# Patient Record
Sex: FEMALE
[De-identification: names, ages, dates, MRNs, and addresses within clinical notes are randomized per-mention and may not be internally consistent; named-entity substitution may affect disease eponyms.]

---

## 2023-07-31 ENCOUNTER — APPOINTMENT (OUTPATIENT)
Dept: INTERNAL MEDICINE | Facility: CLINIC | Age: 28
End: 2023-07-31
Payer: MEDICAID

## 2023-07-31 DIAGNOSIS — G40.909 EPILEPSY, UNSPECIFIED, NOT INTRACTABLE, W/OUT STATUS EPILEPTICUS: ICD-10-CM

## 2023-07-31 DIAGNOSIS — Z00.01 ENCOUNTER FOR GENERAL ADULT MEDICAL EXAMINATION WITH ABNORMAL FINDINGS: ICD-10-CM

## 2023-07-31 DIAGNOSIS — G47.00 INSOMNIA, UNSPECIFIED: ICD-10-CM

## 2023-07-31 DIAGNOSIS — E11.9 TYPE 2 DIABETES MELLITUS W/OUT COMPLICATIONS: ICD-10-CM

## 2023-07-31 DIAGNOSIS — J45.20 MILD INTERMITTENT ASTHMA, UNCOMPLICATED: ICD-10-CM

## 2023-07-31 PROBLEM — Z00.00 ENCOUNTER FOR PREVENTIVE HEALTH EXAMINATION: Status: ACTIVE | Noted: 2023-07-31

## 2023-07-31 PROCEDURE — 99385 PREV VISIT NEW AGE 18-39: CPT

## 2023-07-31 PROCEDURE — 99214 OFFICE O/P EST MOD 30 MIN: CPT | Mod: 25

## 2023-07-31 RX ORDER — LAMOTRIGINE 200 MG/1
200 TABLET ORAL TWICE DAILY
Refills: 0 | Status: ACTIVE | COMMUNITY
Start: 2023-07-31

## 2023-07-31 RX ORDER — CITALOPRAM 40 MG/1
40 TABLET, FILM COATED ORAL DAILY
Refills: 0 | Status: ACTIVE | COMMUNITY
Start: 2023-07-31

## 2023-07-31 RX ORDER — BUDESONIDE AND FORMOTEROL FUMARATE DIHYDRATE 160; 4.5 UG/1; UG/1
160-4.5 AEROSOL RESPIRATORY (INHALATION) TWICE DAILY
Qty: 1 | Refills: 5 | Status: ACTIVE | COMMUNITY
Start: 2023-07-31 | End: 1900-01-01

## 2023-07-31 RX ORDER — ARIPIPRAZOLE 5 MG/1
5 TABLET ORAL DAILY
Refills: 0 | Status: ACTIVE | COMMUNITY
Start: 2023-07-31

## 2023-07-31 RX ORDER — MONTELUKAST 10 MG/1
10 TABLET, FILM COATED ORAL
Qty: 90 | Refills: 3 | Status: ACTIVE | COMMUNITY
Start: 2023-07-31 | End: 1900-01-01

## 2023-07-31 RX ORDER — TRAZODONE HYDROCHLORIDE 50 MG/1
50 TABLET ORAL
Refills: 0 | Status: ACTIVE | COMMUNITY
Start: 2023-07-31

## 2023-07-31 RX ORDER — BUSPIRONE HYDROCHLORIDE 5 MG/1
5 TABLET ORAL TWICE DAILY
Refills: 0 | Status: ACTIVE | COMMUNITY
Start: 2023-07-31

## 2023-07-31 RX ORDER — METFORMIN HYDROCHLORIDE 500 MG/1
500 TABLET, COATED ORAL DAILY
Refills: 0 | Status: COMPLETED | COMMUNITY
Start: 2022-07-31 | End: 2023-07-31

## 2023-08-05 PROBLEM — Z00.01 ENCOUNTER FOR ROUTINE ADULT PHYSICAL EXAM WITH ABNORMAL FINDINGS: Status: ACTIVE | Noted: 2023-08-05

## 2023-10-23 ENCOUNTER — APPOINTMENT (OUTPATIENT)
Dept: INTERNAL MEDICINE | Facility: CLINIC | Age: 28
End: 2023-10-23

## 2023-10-24 ENCOUNTER — APPOINTMENT (OUTPATIENT)
Dept: INTERNAL MEDICINE | Facility: CLINIC | Age: 28
End: 2023-10-24
Payer: COMMERCIAL

## 2023-10-24 VITALS
OXYGEN SATURATION: 98 % | SYSTOLIC BLOOD PRESSURE: 125 MMHG | TEMPERATURE: 98 F | HEART RATE: 90 BPM | DIASTOLIC BLOOD PRESSURE: 84 MMHG

## 2023-10-24 VITALS — BODY MASS INDEX: 37.56 KG/M2 | HEIGHT: 64 IN | WEIGHT: 220 LBS

## 2023-10-24 DIAGNOSIS — F32.A ANXIETY DISORDER, UNSPECIFIED: ICD-10-CM
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
## 2023-10-24 DIAGNOSIS — F41.9 ANXIETY DISORDER, UNSPECIFIED: ICD-10-CM

## 2023-10-24 DIAGNOSIS — S88.919A COMPLETE TRAUMATIC AMPUTATION OF UNSPECIFIED LOWER LEG, LVL UNSPECIFIED, INITIAL ENCOUNTER: ICD-10-CM

## 2023-10-24 PROCEDURE — 99213 OFFICE O/P EST LOW 20 MIN: CPT | Mod: GC

## 2023-10-30 ENCOUNTER — RESULT REVIEW (OUTPATIENT)
Age: 28
End: 2023-10-30

## 2023-10-30 ENCOUNTER — OUTPATIENT (OUTPATIENT)
Dept: OUTPATIENT SERVICES | Facility: HOSPITAL | Age: 28
LOS: 1 days | End: 2023-10-30
Payer: COMMERCIAL

## 2023-10-30 PROCEDURE — 73502 X-RAY EXAM HIP UNI 2-3 VIEWS: CPT

## 2023-10-30 PROCEDURE — 73502 X-RAY EXAM HIP UNI 2-3 VIEWS: CPT | Mod: 26,LT

## 2023-12-20 ENCOUNTER — APPOINTMENT (OUTPATIENT)
Dept: INTERNAL MEDICINE | Facility: CLINIC | Age: 28
End: 2023-12-20

## 2024-02-07 RX ORDER — LIDOCAINE 5% 700 MG/1
5 PATCH TOPICAL
Qty: 30 | Refills: 0 | Status: ACTIVE | COMMUNITY
Start: 2023-10-24 | End: 1900-01-01

## 2024-07-29 ENCOUNTER — RX RENEWAL (OUTPATIENT)
Age: 29
End: 2024-07-29

## 2024-10-04 ENCOUNTER — APPOINTMENT (OUTPATIENT)
Dept: INTERNAL MEDICINE | Facility: CLINIC | Age: 29
End: 2024-10-04
Payer: COMMERCIAL

## 2024-10-04 VITALS
WEIGHT: 196 LBS | SYSTOLIC BLOOD PRESSURE: 116 MMHG | BODY MASS INDEX: 33.46 KG/M2 | DIASTOLIC BLOOD PRESSURE: 81 MMHG | TEMPERATURE: 97.2 F | HEIGHT: 64 IN | OXYGEN SATURATION: 98 % | HEART RATE: 86 BPM

## 2024-10-04 DIAGNOSIS — Z92.89 PERSONAL HISTORY OF OTHER MEDICAL TREATMENT: ICD-10-CM

## 2024-10-04 DIAGNOSIS — Z72.0 TOBACCO USE: ICD-10-CM

## 2024-10-04 DIAGNOSIS — R11.15 CYCLICAL VOMITING SYNDROME UNRELATED TO MIGRAINE: ICD-10-CM

## 2024-10-04 PROCEDURE — 99406 BEHAV CHNG SMOKING 3-10 MIN: CPT

## 2024-10-04 PROCEDURE — 99495 TRANSJ CARE MGMT MOD F2F 14D: CPT | Mod: GC,25

## 2024-10-04 RX ORDER — NICOTINE 21 MG/24HR
14 PATCH, TRANSDERMAL 24 HOURS TRANSDERMAL DAILY
Qty: 1 | Refills: 1 | Status: ACTIVE | COMMUNITY
Start: 2024-10-04 | End: 1900-01-01

## 2024-10-04 RX ORDER — NICOTINE POLACRILEX 2 MG/1
2 GUM, CHEWING BUCCAL
Qty: 1 | Refills: 0 | Status: ACTIVE | COMMUNITY
Start: 2024-10-04 | End: 1900-01-01

## 2024-10-04 RX ORDER — AMITRIPTYLINE HYDROCHLORIDE 25 MG/1
25 TABLET, FILM COATED ORAL
Qty: 30 | Refills: 2 | Status: ACTIVE | COMMUNITY
Start: 2024-10-04 | End: 1900-01-01

## 2024-10-08 NOTE — REVIEW OF SYSTEMS
[Fever] : no fever [Chills] : no chills [Pain] : no pain [Redness] : no redness [Earache] : no earache [Sore Throat] : no sore throat [Chest Pain] : no chest pain [Palpitations] : no palpitations [Lower Ext Edema] : no lower extremity edema [Shortness Of Breath] : no shortness of breath [Wheezing] : no wheezing [Cough] : no cough [Abdominal Pain] : no abdominal pain [Nausea] : no nausea [Constipation] : no constipation [Vomiting] : no vomiting [Headache] : no headache [Dizziness] : no dizziness

## 2024-10-08 NOTE — PHYSICAL EXAM
[No Acute Distress] : no acute distress [Well-Appearing] : well-appearing [PERRL] : pupils equal round and reactive to light [EOMI] : extraocular movements intact [Supple] : supple [No Respiratory Distress] : no respiratory distress  [No Accessory Muscle Use] : no accessory muscle use [Clear to Auscultation] : lungs were clear to auscultation bilaterally [Normal Rate] : normal rate  [Regular Rhythm] : with a regular rhythm [Normal S1, S2] : normal S1 and S2 [Soft] : abdomen soft [Non Tender] : non-tender [Non-distended] : non-distended [Normal Bowel Sounds] : normal bowel sounds [de-identified] : + R AKA, uses crutches

## 2024-10-08 NOTE — HISTORY OF PRESENT ILLNESS
[Post-hospitalization from ___ Hospital] : Post-hospitalization from [unfilled] Hospital [Admitted on: ___] : The patient was admitted on [unfilled] [Discharged on ___] : discharged on [unfilled] [FreeTextEntry2] : 29-year-old female with PMH epilepsy, DM, asthma, childhood osteosarcoma (s/p RLE amputation), depression, anxiety presents post hospital discharge. Pt was initially seen at [facility] on 9/12 for intractable n/v and was given fluids and discharged home. Pt did not improve and return to [facility] on 9/14 and was admitted and discharged on 9/20. Pt was diagnosed with cyclical vomiting syndrome and was sent home with a prescription for Amitriptyline 25mg qhs which has helped prevent any further n/v, however, pt states she lost her pill bottle, unsure if her dogs hid/ate them. Pt states she initially started having n/v last year after cholecystectomy and at that time she was diagnosed with cannabinoid hyperemesis syndrome (at that time she smoked Marijuana once a week). Pt now smokes Marijuana once a month but has not smoked any since August.  Patient also reports that she would like to cut down and quit vaping nicotine. Would like help with nicotine supplements. Pt would also like refills on Symbicort and Montelukast for her asthma.

## 2024-10-08 NOTE — HEALTH RISK ASSESSMENT
[Little interest or pleasure doing things] : 1) Little interest or pleasure doing things [Feeling down, depressed, or hopeless] : 2) Feeling down, depressed, or hopeless [0] : 2) Feeling down, depressed, or hopeless: Not at all (0) [PHQ-9 Negative - No further assessment needed] : PHQ-9 Negative - No further assessment needed [Never] : Never [[id number]] : 0

## 2024-10-08 NOTE — PHYSICAL EXAM
[No Acute Distress] : no acute distress [Well-Appearing] : well-appearing [PERRL] : pupils equal round and reactive to light [EOMI] : extraocular movements intact [Supple] : supple [No Respiratory Distress] : no respiratory distress  [No Accessory Muscle Use] : no accessory muscle use [Clear to Auscultation] : lungs were clear to auscultation bilaterally [Normal Rate] : normal rate  [Regular Rhythm] : with a regular rhythm [Normal S1, S2] : normal S1 and S2 [Soft] : abdomen soft [Non Tender] : non-tender [Non-distended] : non-distended [Normal Bowel Sounds] : normal bowel sounds [de-identified] : + R AKA, uses crutches

## 2024-10-08 NOTE — HEALTH RISK ASSESSMENT
[Little interest or pleasure doing things] : 1) Little interest or pleasure doing things [Feeling down, depressed, or hopeless] : 2) Feeling down, depressed, or hopeless [0] : 2) Feeling down, depressed, or hopeless: Not at all (0) [PHQ-9 Negative - No further assessment needed] : PHQ-9 Negative - No further assessment needed [Never] : Never [[id number]] : 0

## 2024-10-08 NOTE — END OF VISIT
[] : Resident [FreeTextEntry3] : 29F w/cyclical vomiting syndrome, DM, epilepsy here for POA. Was discharged on amitriptyline which has been helping, will refill.  Refer to GI in [facility] system.  Tobacco cessation - patch/gum sent, reassess in 4-6 weeks

## 2024-10-08 NOTE — HISTORY OF PRESENT ILLNESS
[Post-hospitalization from ___ Hospital] : Post-hospitalization from [unfilled] Hospital [Admitted on: ___] : The patient was admitted on [unfilled] [Discharged on ___] : discharged on [unfilled] [FreeTextEntry2] : 29-year-old female with PMH epilepsy, DM, asthma, childhood osteosarcoma (s/p RLE amputation), depression, anxiety presents post hospital discharge. Pt was initially seen at [facility] on 9/12 for intractable n/v and was given fluids and discharged home. Pt did not improve and return to [facility] on 9/14 and was admitted and discharged on 9/20. Pt was diagnosed with cyclical vomiting syndrome and was sent home with a prescription for Amitriptyline 25mg qhs which has helped prevent any further n/v, however, pt states she lost her pill bottle, unsure if her dogs hid/ate them. Pt states she initially started having n/v last year after cholecystectomy and at that time she was diagnosed with cannabinoid hyperemesis syndrome (at that time she smoked Marijuana once a week). Pt now smokes Marijuana once a month but has not smoked any since August.  Patient also reports that she would like to cut down and quit vaping nicotine. Would like help with nicotine supplements. Pt would also like refills on Symbicort and Montelukast for her asthma.

## 2024-10-08 NOTE — END OF VISIT
[] : Resident [FreeTextEntry3] : 29F w/cyclical vomiting syndrome, DM, epilepsy here for POA. Was discharged on amitriptyline which has been helping, will refill.  Refer to GI in [facility] system.  Tobacco cessation - patch/gum sent, reassess in 4-6 weeks

## 2024-10-08 NOTE — PLAN
[FreeTextEntry1] : Case discussed with Dr. Yang  RTSHAYNA in 4-6 weeks to check on nicotine supplements and amitriptyline effectiveness for [facility]. Also, check routine labs during that visit.

## 2024-10-18 ENCOUNTER — APPOINTMENT (OUTPATIENT)
Dept: INTERNAL MEDICINE | Facility: CLINIC | Age: 29
End: 2024-10-18

## 2024-11-27 ENCOUNTER — APPOINTMENT (OUTPATIENT)
Dept: INTERNAL MEDICINE | Facility: CLINIC | Age: 29
End: 2024-11-27